# Patient Record
Sex: MALE | Race: WHITE | NOT HISPANIC OR LATINO | URBAN - METROPOLITAN AREA
[De-identification: names, ages, dates, MRNs, and addresses within clinical notes are randomized per-mention and may not be internally consistent; named-entity substitution may affect disease eponyms.]

---

## 2019-01-29 VITALS
WEIGHT: 270.07 LBS | OXYGEN SATURATION: 97 % | DIASTOLIC BLOOD PRESSURE: 66 MMHG | RESPIRATION RATE: 20 BRPM | SYSTOLIC BLOOD PRESSURE: 124 MMHG | HEIGHT: 69 IN | TEMPERATURE: 99 F | HEART RATE: 110 BPM

## 2019-01-29 LAB
ALBUMIN SERPL ELPH-MCNC: 4.5 G/DL — SIGNIFICANT CHANGE UP (ref 3.3–5)
ALP SERPL-CCNC: 90 U/L — SIGNIFICANT CHANGE UP (ref 40–120)
ALT FLD-CCNC: 29 U/L — SIGNIFICANT CHANGE UP (ref 10–45)
ANION GAP SERPL CALC-SCNC: 12 MMOL/L — SIGNIFICANT CHANGE UP (ref 5–17)
APTT BLD: 29.7 SEC — SIGNIFICANT CHANGE UP (ref 27.5–36.3)
AST SERPL-CCNC: 19 U/L — SIGNIFICANT CHANGE UP (ref 10–40)
BASOPHILS NFR BLD AUTO: 0.1 % — SIGNIFICANT CHANGE UP (ref 0–2)
BILIRUB SERPL-MCNC: 0.5 MG/DL — SIGNIFICANT CHANGE UP (ref 0.2–1.2)
BUN SERPL-MCNC: 17 MG/DL — SIGNIFICANT CHANGE UP (ref 7–23)
CALCIUM SERPL-MCNC: 9.5 MG/DL — SIGNIFICANT CHANGE UP (ref 8.4–10.5)
CHLORIDE SERPL-SCNC: 101 MMOL/L — SIGNIFICANT CHANGE UP (ref 96–108)
CO2 SERPL-SCNC: 27 MMOL/L — SIGNIFICANT CHANGE UP (ref 22–31)
CREAT SERPL-MCNC: 1.22 MG/DL — SIGNIFICANT CHANGE UP (ref 0.5–1.3)
EOSINOPHIL NFR BLD AUTO: 0.1 % — SIGNIFICANT CHANGE UP (ref 0–6)
GLUCOSE SERPL-MCNC: 148 MG/DL — HIGH (ref 70–99)
HCT VFR BLD CALC: 46.2 % — SIGNIFICANT CHANGE UP (ref 39–50)
HGB BLD-MCNC: 16.1 G/DL — SIGNIFICANT CHANGE UP (ref 13–17)
INR BLD: 1.15 — SIGNIFICANT CHANGE UP (ref 0.88–1.16)
LIDOCAIN IGE QN: 21 U/L — SIGNIFICANT CHANGE UP (ref 7–60)
LYMPHOCYTES # BLD AUTO: 4.2 % — LOW (ref 13–44)
MCHC RBC-ENTMCNC: 30.6 PG — SIGNIFICANT CHANGE UP (ref 27–34)
MCHC RBC-ENTMCNC: 34.8 G/DL — SIGNIFICANT CHANGE UP (ref 32–36)
MCV RBC AUTO: 87.7 FL — SIGNIFICANT CHANGE UP (ref 80–100)
MONOCYTES NFR BLD AUTO: 5.3 % — SIGNIFICANT CHANGE UP (ref 2–14)
NEUTROPHILS NFR BLD AUTO: 90.3 % — HIGH (ref 43–77)
PLATELET # BLD AUTO: 187 K/UL — SIGNIFICANT CHANGE UP (ref 150–400)
POTASSIUM SERPL-MCNC: 3.9 MMOL/L — SIGNIFICANT CHANGE UP (ref 3.5–5.3)
POTASSIUM SERPL-SCNC: 3.9 MMOL/L — SIGNIFICANT CHANGE UP (ref 3.5–5.3)
PROT SERPL-MCNC: 7.6 G/DL — SIGNIFICANT CHANGE UP (ref 6–8.3)
PROTHROM AB SERPL-ACNC: 13.1 SEC — HIGH (ref 10–12.9)
RBC # BLD: 5.27 M/UL — SIGNIFICANT CHANGE UP (ref 4.2–5.8)
RBC # FLD: 11.6 % — SIGNIFICANT CHANGE UP (ref 10.3–16.9)
SODIUM SERPL-SCNC: 140 MMOL/L — SIGNIFICANT CHANGE UP (ref 135–145)
WBC # BLD: 16.7 K/UL — HIGH (ref 3.8–10.5)
WBC # FLD AUTO: 16.7 K/UL — HIGH (ref 3.8–10.5)

## 2019-01-29 PROCEDURE — 74177 CT ABD & PELVIS W/CONTRAST: CPT | Mod: 26

## 2019-01-29 RX ORDER — ONDANSETRON 8 MG/1
4 TABLET, FILM COATED ORAL ONCE
Qty: 0 | Refills: 0 | Status: COMPLETED | OUTPATIENT
Start: 2019-01-29 | End: 2019-01-29

## 2019-01-29 RX ORDER — SODIUM CHLORIDE 9 MG/ML
1000 INJECTION INTRAMUSCULAR; INTRAVENOUS; SUBCUTANEOUS ONCE
Qty: 0 | Refills: 0 | Status: COMPLETED | OUTPATIENT
Start: 2019-01-29 | End: 2019-01-29

## 2019-01-29 RX ORDER — IOHEXOL 300 MG/ML
30 INJECTION, SOLUTION INTRAVENOUS ONCE
Qty: 0 | Refills: 0 | Status: COMPLETED | OUTPATIENT
Start: 2019-01-29 | End: 2019-01-29

## 2019-01-29 RX ORDER — HYDROMORPHONE HYDROCHLORIDE 2 MG/ML
0.5 INJECTION INTRAMUSCULAR; INTRAVENOUS; SUBCUTANEOUS ONCE
Qty: 0 | Refills: 0 | Status: DISCONTINUED | OUTPATIENT
Start: 2019-01-29 | End: 2019-01-29

## 2019-01-29 RX ADMIN — SODIUM CHLORIDE 1000 MILLILITER(S): 9 INJECTION INTRAMUSCULAR; INTRAVENOUS; SUBCUTANEOUS at 19:47

## 2019-01-29 RX ADMIN — HYDROMORPHONE HYDROCHLORIDE 0.5 MILLIGRAM(S): 2 INJECTION INTRAMUSCULAR; INTRAVENOUS; SUBCUTANEOUS at 19:48

## 2019-01-29 RX ADMIN — HYDROMORPHONE HYDROCHLORIDE 0.5 MILLIGRAM(S): 2 INJECTION INTRAMUSCULAR; INTRAVENOUS; SUBCUTANEOUS at 20:19

## 2019-01-29 RX ADMIN — SODIUM CHLORIDE 1000 MILLILITER(S): 9 INJECTION INTRAMUSCULAR; INTRAVENOUS; SUBCUTANEOUS at 22:19

## 2019-01-29 RX ADMIN — IOHEXOL 30 MILLILITER(S): 300 INJECTION, SOLUTION INTRAVENOUS at 19:47

## 2019-01-29 RX ADMIN — ONDANSETRON 4 MILLIGRAM(S): 8 TABLET, FILM COATED ORAL at 19:47

## 2019-01-29 NOTE — ED CLERICAL - NS ED CLERK NOTE PRE-ARRIVAL INFORMATION; ADDITIONAL PRE-ARRIVAL INFORMATION
38 y.o male Matthew Porter being sent in for right lower quadrant tenderness, evaluate for appendicitis. Contact DR. Payne [phone]

## 2019-01-29 NOTE — ED ADULT TRIAGE NOTE - CHIEF COMPLAINT QUOTE
Patient c/o RLQ abdominal pain and nausea started at 3pm today , was sent from urgent care for r/o appendicitis .

## 2019-01-29 NOTE — ED ADULT NURSE NOTE - NSIMPLEMENTINTERV_GEN_ALL_ED
Implemented All Universal Safety Interventions:  [place] to call system. Call bell, personal items and telephone within reach. Instruct patient to call for assistance. Room bathroom lighting operational. Non-slip footwear when patient is off stretcher. Physically safe environment: no spills, clutter or unnecessary equipment. Stretcher in lowest position, wheels locked, appropriate side rails in place.

## 2019-01-29 NOTE — ED PROVIDER NOTE - CARE PLAN
Principal Discharge DX:	Right lower quadrant abdominal pain  Secondary Diagnosis:	Leukocytosis, unspecified type Principal Discharge DX:	Acute appendicitis  Secondary Diagnosis:	Leukocytosis, unspecified type

## 2019-01-29 NOTE — ED ADULT NURSE NOTE - CHPI ED NUR SYMPTOMS NEG
no diarrhea/no blood in stool/no burning urination/no abdominal distension/no vomiting/no fever/no hematuria/no dysuria

## 2019-01-29 NOTE — ED PROVIDER NOTE - MEDICAL DECISION MAKING DETAILS
Patient in ED w concern for right lower Patient in ED w concern for right lower quad abd pain.  Sent from  for CT.  Labs reviewed - leukocytosis noted.  Following completion of my shift, patient's care is handed over to oncoming night team for final disposition pending CT abd/pel.  Patient is stable at time of transfer of care.

## 2019-01-29 NOTE — ED ADULT NURSE NOTE - OBJECTIVE STATEMENT
Pt a&ox3 walked in from triage complaining of RLQ pain, 10/10 that suddenly started this am. Pt c/o nausea denies vomiting. Went to urgent care and they said to come to the ER for rule out appendicitis. Pt denies fevers, chills. Speaking in full sentences. Family at bedside

## 2019-01-29 NOTE — ED ADULT NURSE REASSESSMENT NOTE - NS ED NURSE REASSESS COMMENT FT1
Abdominal pain, nausea and vomitting resolved s/p meds, NSS bolus, vital signs stable.  CT scan result and disposition pending.

## 2019-01-29 NOTE — ED PROVIDER NOTE - OBJECTIVE STATEMENT
38 year old male with no known medical history presents to ED with concern for right lower quadrant abdominal pain that began several hours ago today.  Patient notes associated nausea without emesis.  He denies associated fever, chills, chest pain, shortness of breath, changes to bowel movements, urinary symptoms or any additional acute complaints or concerns at this time.

## 2019-01-30 ENCOUNTER — INPATIENT (INPATIENT)
Facility: HOSPITAL | Age: 39
LOS: 2 days | Discharge: HOME | DRG: 340 | End: 2019-02-02
Attending: SURGERY | Admitting: SURGERY
Payer: COMMERCIAL

## 2019-01-30 DIAGNOSIS — K35.32 ACUTE APPENDICITIS WITH PERFORATION, LOCALIZED PERITONITIS, AND GANGRENE, WITHOUT ABSCESS: ICD-10-CM

## 2019-01-30 LAB
ANION GAP SERPL CALC-SCNC: 12 MMOL/L — SIGNIFICANT CHANGE UP (ref 5–17)
BLD GP AB SCN SERPL QL: NEGATIVE — SIGNIFICANT CHANGE UP
BLD GP AB SCN SERPL QL: NEGATIVE — SIGNIFICANT CHANGE UP
BUN SERPL-MCNC: 16 MG/DL — SIGNIFICANT CHANGE UP (ref 7–23)
CALCIUM SERPL-MCNC: 9.5 MG/DL — SIGNIFICANT CHANGE UP (ref 8.4–10.5)
CHLORIDE SERPL-SCNC: 101 MMOL/L — SIGNIFICANT CHANGE UP (ref 96–108)
CO2 SERPL-SCNC: 24 MMOL/L — SIGNIFICANT CHANGE UP (ref 22–31)
CREAT SERPL-MCNC: 1.36 MG/DL — HIGH (ref 0.5–1.3)
GLUCOSE SERPL-MCNC: 139 MG/DL — HIGH (ref 70–99)
HCT VFR BLD CALC: 43.9 % — SIGNIFICANT CHANGE UP (ref 39–50)
HGB BLD-MCNC: 15.2 G/DL — SIGNIFICANT CHANGE UP (ref 13–17)
MAGNESIUM SERPL-MCNC: 2.1 MG/DL — SIGNIFICANT CHANGE UP (ref 1.6–2.6)
MCHC RBC-ENTMCNC: 31.1 PG — SIGNIFICANT CHANGE UP (ref 27–34)
MCHC RBC-ENTMCNC: 34.6 G/DL — SIGNIFICANT CHANGE UP (ref 32–36)
MCV RBC AUTO: 89.8 FL — SIGNIFICANT CHANGE UP (ref 80–100)
PHOSPHATE SERPL-MCNC: 3.5 MG/DL — SIGNIFICANT CHANGE UP (ref 2.5–4.5)
PLATELET # BLD AUTO: 195 K/UL — SIGNIFICANT CHANGE UP (ref 150–400)
POTASSIUM SERPL-MCNC: 4.1 MMOL/L — SIGNIFICANT CHANGE UP (ref 3.5–5.3)
POTASSIUM SERPL-SCNC: 4.1 MMOL/L — SIGNIFICANT CHANGE UP (ref 3.5–5.3)
RBC # BLD: 4.89 M/UL — SIGNIFICANT CHANGE UP (ref 4.2–5.8)
RBC # FLD: 11.9 % — SIGNIFICANT CHANGE UP (ref 10.3–16.9)
RH IG SCN BLD-IMP: POSITIVE — SIGNIFICANT CHANGE UP
RH IG SCN BLD-IMP: POSITIVE — SIGNIFICANT CHANGE UP
SODIUM SERPL-SCNC: 137 MMOL/L — SIGNIFICANT CHANGE UP (ref 135–145)
WBC # BLD: 14 K/UL — HIGH (ref 3.8–10.5)
WBC # FLD AUTO: 14 K/UL — HIGH (ref 3.8–10.5)

## 2019-01-30 PROCEDURE — 99285 EMERGENCY DEPT VISIT HI MDM: CPT

## 2019-01-30 RX ORDER — PIPERACILLIN AND TAZOBACTAM 4; .5 G/20ML; G/20ML
3.38 INJECTION, POWDER, LYOPHILIZED, FOR SOLUTION INTRAVENOUS EVERY 6 HOURS
Qty: 0 | Refills: 0 | Status: DISCONTINUED | OUTPATIENT
Start: 2019-01-30 | End: 2019-01-30

## 2019-01-30 RX ORDER — INFLUENZA VIRUS VACCINE 15; 15; 15; 15 UG/.5ML; UG/.5ML; UG/.5ML; UG/.5ML
0.5 SUSPENSION INTRAMUSCULAR ONCE
Qty: 0 | Refills: 0 | Status: DISCONTINUED | OUTPATIENT
Start: 2019-01-30 | End: 2019-02-02

## 2019-01-30 RX ORDER — HEPARIN SODIUM 5000 [USP'U]/ML
7500 INJECTION INTRAVENOUS; SUBCUTANEOUS EVERY 8 HOURS
Qty: 0 | Refills: 0 | Status: DISCONTINUED | OUTPATIENT
Start: 2019-01-30 | End: 2019-01-30

## 2019-01-30 RX ORDER — HYDROMORPHONE HYDROCHLORIDE 2 MG/ML
0.5 INJECTION INTRAMUSCULAR; INTRAVENOUS; SUBCUTANEOUS ONCE
Qty: 0 | Refills: 0 | Status: DISCONTINUED | OUTPATIENT
Start: 2019-01-30 | End: 2019-01-30

## 2019-01-30 RX ORDER — SODIUM CHLORIDE 9 MG/ML
1000 INJECTION, SOLUTION INTRAVENOUS
Qty: 0 | Refills: 0 | Status: DISCONTINUED | OUTPATIENT
Start: 2019-01-30 | End: 2019-02-01

## 2019-01-30 RX ORDER — HYDROMORPHONE HYDROCHLORIDE 2 MG/ML
0.5 INJECTION INTRAMUSCULAR; INTRAVENOUS; SUBCUTANEOUS
Qty: 0 | Refills: 0 | Status: DISCONTINUED | OUTPATIENT
Start: 2019-01-30 | End: 2019-01-30

## 2019-01-30 RX ORDER — SODIUM CHLORIDE 9 MG/ML
1000 INJECTION, SOLUTION INTRAVENOUS
Qty: 0 | Refills: 0 | Status: DISCONTINUED | OUTPATIENT
Start: 2019-01-30 | End: 2019-01-30

## 2019-01-30 RX ORDER — KETOROLAC TROMETHAMINE 30 MG/ML
15 SYRINGE (ML) INJECTION EVERY 6 HOURS
Qty: 0 | Refills: 0 | Status: DISCONTINUED | OUTPATIENT
Start: 2019-01-30 | End: 2019-02-02

## 2019-01-30 RX ORDER — KETOROLAC TROMETHAMINE 30 MG/ML
15 SYRINGE (ML) INJECTION EVERY 6 HOURS
Qty: 0 | Refills: 0 | Status: DISCONTINUED | OUTPATIENT
Start: 2019-01-30 | End: 2019-01-30

## 2019-01-30 RX ORDER — HYDROMORPHONE HYDROCHLORIDE 2 MG/ML
0.5 INJECTION INTRAMUSCULAR; INTRAVENOUS; SUBCUTANEOUS EVERY 6 HOURS
Qty: 0 | Refills: 0 | Status: DISCONTINUED | OUTPATIENT
Start: 2019-01-30 | End: 2019-01-30

## 2019-01-30 RX ORDER — HEPARIN SODIUM 5000 [USP'U]/ML
7500 INJECTION INTRAVENOUS; SUBCUTANEOUS EVERY 8 HOURS
Qty: 0 | Refills: 0 | Status: DISCONTINUED | OUTPATIENT
Start: 2019-01-30 | End: 2019-02-02

## 2019-01-30 RX ORDER — HYDROMORPHONE HYDROCHLORIDE 2 MG/ML
0.5 INJECTION INTRAMUSCULAR; INTRAVENOUS; SUBCUTANEOUS
Qty: 0 | Refills: 0 | Status: DISCONTINUED | OUTPATIENT
Start: 2019-01-30 | End: 2019-02-02

## 2019-01-30 RX ORDER — BUPIVACAINE 13.3 MG/ML
20 INJECTION, SUSPENSION, LIPOSOMAL INFILTRATION ONCE
Qty: 0 | Refills: 0 | Status: DISCONTINUED | OUTPATIENT
Start: 2019-01-30 | End: 2019-02-02

## 2019-01-30 RX ORDER — PIPERACILLIN AND TAZOBACTAM 4; .5 G/20ML; G/20ML
3.38 INJECTION, POWDER, LYOPHILIZED, FOR SOLUTION INTRAVENOUS ONCE
Qty: 0 | Refills: 0 | Status: COMPLETED | OUTPATIENT
Start: 2019-01-30 | End: 2019-01-30

## 2019-01-30 RX ORDER — HYDROMORPHONE HYDROCHLORIDE 2 MG/ML
1 INJECTION INTRAMUSCULAR; INTRAVENOUS; SUBCUTANEOUS EVERY 6 HOURS
Qty: 0 | Refills: 0 | Status: DISCONTINUED | OUTPATIENT
Start: 2019-01-30 | End: 2019-01-30

## 2019-01-30 RX ORDER — ACETAMINOPHEN 500 MG
1000 TABLET ORAL ONCE
Qty: 0 | Refills: 0 | Status: COMPLETED | OUTPATIENT
Start: 2019-01-30 | End: 2019-01-30

## 2019-01-30 RX ORDER — ONDANSETRON 8 MG/1
4 TABLET, FILM COATED ORAL EVERY 6 HOURS
Qty: 0 | Refills: 0 | Status: DISCONTINUED | OUTPATIENT
Start: 2019-01-30 | End: 2019-02-02

## 2019-01-30 RX ORDER — PIPERACILLIN AND TAZOBACTAM 4; .5 G/20ML; G/20ML
3.38 INJECTION, POWDER, LYOPHILIZED, FOR SOLUTION INTRAVENOUS EVERY 6 HOURS
Qty: 0 | Refills: 0 | Status: DISCONTINUED | OUTPATIENT
Start: 2019-01-30 | End: 2019-02-02

## 2019-01-30 RX ADMIN — Medication 15 MILLIGRAM(S): at 12:22

## 2019-01-30 RX ADMIN — Medication 15 MILLIGRAM(S): at 18:17

## 2019-01-30 RX ADMIN — PIPERACILLIN AND TAZOBACTAM 200 GRAM(S): 4; .5 INJECTION, POWDER, LYOPHILIZED, FOR SOLUTION INTRAVENOUS at 12:06

## 2019-01-30 RX ADMIN — Medication 15 MILLIGRAM(S): at 18:02

## 2019-01-30 RX ADMIN — HYDROMORPHONE HYDROCHLORIDE 0.5 MILLIGRAM(S): 2 INJECTION INTRAMUSCULAR; INTRAVENOUS; SUBCUTANEOUS at 00:32

## 2019-01-30 RX ADMIN — Medication 400 MILLIGRAM(S): at 05:30

## 2019-01-30 RX ADMIN — Medication 15 MILLIGRAM(S): at 23:59

## 2019-01-30 RX ADMIN — SODIUM CHLORIDE 160 MILLILITER(S): 9 INJECTION, SOLUTION INTRAVENOUS at 02:27

## 2019-01-30 RX ADMIN — PIPERACILLIN AND TAZOBACTAM 200 GRAM(S): 4; .5 INJECTION, POWDER, LYOPHILIZED, FOR SOLUTION INTRAVENOUS at 23:59

## 2019-01-30 RX ADMIN — Medication 15 MILLIGRAM(S): at 12:03

## 2019-01-30 RX ADMIN — HEPARIN SODIUM 7500 UNIT(S): 5000 INJECTION INTRAVENOUS; SUBCUTANEOUS at 21:33

## 2019-01-30 RX ADMIN — PIPERACILLIN AND TAZOBACTAM 200 GRAM(S): 4; .5 INJECTION, POWDER, LYOPHILIZED, FOR SOLUTION INTRAVENOUS at 18:02

## 2019-01-30 RX ADMIN — PIPERACILLIN AND TAZOBACTAM 200 GRAM(S): 4; .5 INJECTION, POWDER, LYOPHILIZED, FOR SOLUTION INTRAVENOUS at 01:49

## 2019-01-30 RX ADMIN — PIPERACILLIN AND TAZOBACTAM 25 GRAM(S): 4; .5 INJECTION, POWDER, LYOPHILIZED, FOR SOLUTION INTRAVENOUS at 06:15

## 2019-01-30 RX ADMIN — HYDROMORPHONE HYDROCHLORIDE 0.5 MILLIGRAM(S): 2 INJECTION INTRAMUSCULAR; INTRAVENOUS; SUBCUTANEOUS at 00:49

## 2019-01-30 NOTE — PROGRESS NOTE ADULT - SUBJECTIVE AND OBJECTIVE BOX
POST-OPERATIVE NOTE    Procedure: laparoscopic appendectomy    Diagnosis/Indication: appendicitis    Surgeon: Dr. Jackson    S: Pt only c/o minimal pain with deep inhalations. Denies CP, SOB,  calf tenderness. Pain controlled with medication. Denies nausea, vomiting.    O:  T(C): 37.8 (01-30-19 @ 11:50), Max: 37.8 (01-30-19 @ 11:50)  T(F): 100.1 (01-30-19 @ 11:50), Max: 100.1 (01-30-19 @ 11:50)  HR: 86 (01-30-19 @ 12:20) (70 - 90)  BP: 108/79 (01-30-19 @ 12:20) (102/81 - 134/72)  RR: 17 (01-30-19 @ 12:20) (15 - 19)  SpO2: 95% (01-30-19 @ 12:20) (91% - 97%)  Wt(kg): --                        15.2   14.0  )-----------( 195      ( 30 Jan 2019 06:30 )             43.9     01-30    137  |  101  |  16  ----------------------------<  139<H>  4.1   |  24  |  1.36<H>    Ca    9.5      30 Jan 2019 06:30  Phos  3.5     01-30  Mg     2.1     01-30    TPro  7.6  /  Alb  4.5  /  TBili  0.5  /  DBili  x   /  AST  19  /  ALT  29  /  AlkPhos  90  01-29      Gen: NAD, resting comfortably in bed  C/V: NSR  Pulm: Nonlabored breathing, no respiratory distress  Abd: obese, softly distended, nontender, incisions CDI  Extrem:  SCDs in place

## 2019-01-30 NOTE — CONSULT NOTE ADULT - ASSESSMENT
37 yo Male with acute appendicitis with leukocytosis of [id number]  now s/p lap appendectomy w/perforation and gangrene

## 2019-01-30 NOTE — H&P ADULT - HISTORY OF PRESENT ILLNESS
This is a 37yo M with no PMHx or PSHx, presented with sudden onset of abdominal pain since 2.30pm yesterday. Pain started at RLQ and sharp in nature. It did not radiates/migrates anywhere. Pain aggravated upon movement and relieved at rest. No nausea/vomiting, no fever or chills. Last meal was 2pm yesterday.

## 2019-01-30 NOTE — PRE-OP CHECKLIST - SELECT TESTS ORDERED
CBC/CMP/Type and Screen/BMP/INR/PT/PTT no CXR per Md Sanders/INR/CMP/EKG/BMP/Type and Screen/CBC/PT/PTT

## 2019-01-30 NOTE — CONSULT NOTE ADULT - SUBJECTIVE AND OBJECTIVE BOX
HPI:  This is a 39yo M with no PMHx or PSHx, presented with sudden onset of abdominal pain since 2.30pm yesterday. Pain started at RLQ and sharp in nature. It did not radiates/migrates anywhere. Pain aggravated upon movement and relieved at rest. No nausea/vomiting, no fever or chills. Last meal was 2pm yesterday. (30 Jan 2019 02:00)  Patient underwenr Ex lap appendectomy and found perforated and gangrenous appendix; started on IV Zosyn.      PAST MEDICAL & SURGICAL HISTORY:  No pertinent past medical history  No significant past surgical history        REVIEW OF SYSTEMS:    General:  no weakness; low grade fevers, no chills  Skin/Breast: no rash  Respiratory and Thorax: no SOB, no cough  Cardiovascular:	No chest pain  Gastrointestinal:	 no nausea, vomiting postop pain well controlled  Genitourinary:	no dysuria, no difficulty urinating, no hematuria  Musculoskeletal:	no weakness, no joint swelling/pain  Neurological: no focal weakness/numbness  Endocrine: no polyuria, no polydipsia      ANTIBIOTICS:  MEDICATIONS  (STANDING):  BUpivacaine liposome 1.3% Injectable (no eMAR) 20 milliLiter(s) Local Injection once  heparin  Injectable 7500 Unit(s) SubCutaneous every 8 hours  influenza   Vaccine 0.5 milliLiter(s) IntraMuscular once  ketorolac   Injectable 15 milliGRAM(s) IV Push every 6 hours  lactated ringers. 1000 milliLiter(s) (150 mL/Hr) IV Continuous <Continuous>  piperacillin/tazobactam IVPB. 3.375 Gram(s) IV Intermittent every 6 hours    MEDICATIONS  (PRN):  HYDROmorphone  Injectable 0.5 milliGRAM(s) IV Push every 1 hour PRN Severe Pain (7 - 10)  ondansetron Injectable 4 milliGRAM(s) IV Push every 6 hours PRN Nausea      Allergies: No Known Allergies    SOCIAL HISTORY: no ETOH, no smoking    FAMILY HISTORY:n/c      Vital Signs Last 24 Hrs  T(C): 37.1 (30 Jan 2019 16:55), Max: 38.1 (30 Jan 2019 02:43)  T(F): 98.8 (30 Jan 2019 16:55), Max: 100.5 (30 Jan 2019 02:43)  HR: 80 (30 Jan 2019 16:55) (70 - 104)  BP: 106/73 (30 Jan 2019 16:55) (100/62 - 136/84)  BP(mean): 94 (30 Jan 2019 12:20) (70 - 97)  RR: 17 (30 Jan 2019 16:55) (15 - 19)  SpO2: 95% (30 Jan 2019 16:55) (91% - 98%)    01-30-19 @ 07:01  -  01-30-19 @ 19:36  --------------------------------------------------------  IN: 1775 mL / OUT: 1535 mL / NET: 240 mL        PHYSICAL EXAM:  Constitutional:Well-developed, well nourished  Eyes:ZEKE, EOMI  Ear/Nose/Throat: no oral lesion, no sinus tenderness on percussion	  Neck:no JVD, no lymphadenopathy, supple  Respiratory: CTA boogie  Cardiovascular: S1S2 RRR, no murmurs  Gastrointestinal: postop incisional tenderness  Extremities:no e/e/c  Vascular: DP Pulse:right normal; left normal            LABS:                        15.2   14.0  )-----------( 195      ( 30 Jan 2019 06:30 )             43.9     01-30    137  |  101  |  16  ----------------------------<  139<H>  4.1   |  24  |  1.36<H>    Ca    9.5      30 Jan 2019 06:30  Phos  3.5     01-30  Mg     2.1     01-30    TPro  7.6  /  Alb  4.5  /  TBili  0.5  /  DBili  x   /  AST  19  /  ALT  29  /  AlkPhos  90  01-29    PT/INR - ( 29 Jan 2019 19:47 )   PT: 13.1 sec;   INR: 1.15          PTT - ( 29 Jan 2019 19:47 )  PTT:29.7 sec      MICROBIOLOGY:  RADIOLOGY & ADDITIONAL STUDIES:

## 2019-01-30 NOTE — H&P ADULT - NSHPPHYSICALEXAM_GEN_ALL_CORE
Vital Signs Last 24 Hrs  T(C): 37.3 (30 Jan 2019 01:33), Max: 37.3 (30 Jan 2019 01:33)  T(F): 99.1 (30 Jan 2019 01:33), Max: 99.1 (30 Jan 2019 01:33)  HR: 100 (30 Jan 2019 01:33) (98 - 110)  BP: 119/78 (30 Jan 2019 01:33) (119/78 - 136/84)  RR: 17 (30 Jan 2019 01:33) (16 - 20)  SpO2: 96% (30 Jan 2019 01:33) (96% - 98%)    General: NAD, resting comfortably in bed  C/V: NSR  Pulm: Nonlabored breathing, no respiratory distress  Abd: soft, tender at RLQ, no rebound tenderness, no rovsing sign.  Extrem: WWP, no edema.

## 2019-01-30 NOTE — H&P ADULT - ASSESSMENT
37 yo M with acute appendicitis with leukocytosis of 16.    Plan :   Admit to [facility], regional floor, team 4  Pain and nausea control  NPO/IVF  Preop and added on for laparoscopic appendectomy 1/30.  IV.Zosyn stat and Q6hr  DVT prophylaxis  AM labs     Plan d/w , seen and d/w chief.

## 2019-01-31 LAB
ANION GAP SERPL CALC-SCNC: 12 MMOL/L — SIGNIFICANT CHANGE UP (ref 5–17)
BUN SERPL-MCNC: 16 MG/DL — SIGNIFICANT CHANGE UP (ref 7–23)
CALCIUM SERPL-MCNC: 8.9 MG/DL — SIGNIFICANT CHANGE UP (ref 8.4–10.5)
CHLORIDE SERPL-SCNC: 104 MMOL/L — SIGNIFICANT CHANGE UP (ref 96–108)
CO2 SERPL-SCNC: 24 MMOL/L — SIGNIFICANT CHANGE UP (ref 22–31)
CREAT SERPL-MCNC: 1.34 MG/DL — HIGH (ref 0.5–1.3)
GLUCOSE SERPL-MCNC: 106 MG/DL — HIGH (ref 70–99)
HCT VFR BLD CALC: 39.1 % — SIGNIFICANT CHANGE UP (ref 39–50)
HGB BLD-MCNC: 13 G/DL — SIGNIFICANT CHANGE UP (ref 13–17)
MAGNESIUM SERPL-MCNC: 2 MG/DL — SIGNIFICANT CHANGE UP (ref 1.6–2.6)
MCHC RBC-ENTMCNC: 30.3 PG — SIGNIFICANT CHANGE UP (ref 27–34)
MCHC RBC-ENTMCNC: 33.2 G/DL — SIGNIFICANT CHANGE UP (ref 32–36)
MCV RBC AUTO: 91.1 FL — SIGNIFICANT CHANGE UP (ref 80–100)
PHOSPHATE SERPL-MCNC: 3.5 MG/DL — SIGNIFICANT CHANGE UP (ref 2.5–4.5)
PLATELET # BLD AUTO: 156 K/UL — SIGNIFICANT CHANGE UP (ref 150–400)
POTASSIUM SERPL-MCNC: 3.8 MMOL/L — SIGNIFICANT CHANGE UP (ref 3.5–5.3)
POTASSIUM SERPL-SCNC: 3.8 MMOL/L — SIGNIFICANT CHANGE UP (ref 3.5–5.3)
RBC # BLD: 4.29 M/UL — SIGNIFICANT CHANGE UP (ref 4.2–5.8)
RBC # FLD: 11.9 % — SIGNIFICANT CHANGE UP (ref 10.3–16.9)
SODIUM SERPL-SCNC: 140 MMOL/L — SIGNIFICANT CHANGE UP (ref 135–145)
WBC # BLD: 9.7 K/UL — SIGNIFICANT CHANGE UP (ref 3.8–10.5)
WBC # FLD AUTO: 9.7 K/UL — SIGNIFICANT CHANGE UP (ref 3.8–10.5)

## 2019-01-31 RX ORDER — TAMSULOSIN HYDROCHLORIDE 0.4 MG/1
0.4 CAPSULE ORAL ONCE
Qty: 0 | Refills: 0 | Status: COMPLETED | OUTPATIENT
Start: 2019-01-31 | End: 2019-01-31

## 2019-01-31 RX ORDER — TAMSULOSIN HYDROCHLORIDE 0.4 MG/1
0.4 CAPSULE ORAL AT BEDTIME
Qty: 0 | Refills: 0 | Status: DISCONTINUED | OUTPATIENT
Start: 2019-01-31 | End: 2019-01-31

## 2019-01-31 RX ADMIN — Medication 15 MILLIGRAM(S): at 05:30

## 2019-01-31 RX ADMIN — Medication 15 MILLIGRAM(S): at 11:58

## 2019-01-31 RX ADMIN — PIPERACILLIN AND TAZOBACTAM 200 GRAM(S): 4; .5 INJECTION, POWDER, LYOPHILIZED, FOR SOLUTION INTRAVENOUS at 23:24

## 2019-01-31 RX ADMIN — PIPERACILLIN AND TAZOBACTAM 200 GRAM(S): 4; .5 INJECTION, POWDER, LYOPHILIZED, FOR SOLUTION INTRAVENOUS at 11:58

## 2019-01-31 RX ADMIN — Medication 15 MILLIGRAM(S): at 23:24

## 2019-01-31 RX ADMIN — HEPARIN SODIUM 7500 UNIT(S): 5000 INJECTION INTRAVENOUS; SUBCUTANEOUS at 21:04

## 2019-01-31 RX ADMIN — SODIUM CHLORIDE 150 MILLILITER(S): 9 INJECTION, SOLUTION INTRAVENOUS at 11:59

## 2019-01-31 RX ADMIN — HEPARIN SODIUM 7500 UNIT(S): 5000 INJECTION INTRAVENOUS; SUBCUTANEOUS at 05:30

## 2019-01-31 RX ADMIN — Medication 15 MILLIGRAM(S): at 23:45

## 2019-01-31 RX ADMIN — Medication 15 MILLIGRAM(S): at 06:00

## 2019-01-31 RX ADMIN — Medication 15 MILLIGRAM(S): at 00:30

## 2019-01-31 RX ADMIN — SODIUM CHLORIDE 150 MILLILITER(S): 9 INJECTION, SOLUTION INTRAVENOUS at 23:24

## 2019-01-31 RX ADMIN — Medication 15 MILLIGRAM(S): at 17:46

## 2019-01-31 RX ADMIN — TAMSULOSIN HYDROCHLORIDE 0.4 MILLIGRAM(S): 0.4 CAPSULE ORAL at 12:30

## 2019-01-31 RX ADMIN — PIPERACILLIN AND TAZOBACTAM 200 GRAM(S): 4; .5 INJECTION, POWDER, LYOPHILIZED, FOR SOLUTION INTRAVENOUS at 17:46

## 2019-01-31 RX ADMIN — PIPERACILLIN AND TAZOBACTAM 200 GRAM(S): 4; .5 INJECTION, POWDER, LYOPHILIZED, FOR SOLUTION INTRAVENOUS at 05:29

## 2019-01-31 RX ADMIN — HEPARIN SODIUM 7500 UNIT(S): 5000 INJECTION INTRAVENOUS; SUBCUTANEOUS at 13:40

## 2019-01-31 NOTE — PROGRESS NOTE ADULT - ATTENDING COMMENTS
Abdomen soft, appropriately tender, BS+, Flatus+, BM-, tolerating clear liquid diet, FRANDY serous.  IV ABX, DVT prophylaxis , Spirometry, OOB to ambulate.

## 2019-01-31 NOTE — PROGRESS NOTE ADULT - SUBJECTIVE AND OBJECTIVE BOX
INTERVAL HPI/OVERNIGHT EVENTS:   SURGERY ATTENDING    STATUS POST:  Laparoscopic appendectomy, Lysis of adhesions, evacuation of right upper quadrant abscess, abdominal washout and drainage for acute perforated gangrenous appendicitis with peritonitis.    POST OPERATIVE DAY #: 1    SUBJECTIVE:  Flatus: [x ] YES [ ] NO             Bowel Movement: [ ] YES [x ] NO  Pain (0-10):        2    Pain Control Adequate: [x ] YES [ ] NO  Nausea: [ ] YES [x ] NO            Vomiting: [ ] YES [x ] NO  Diarrhea: [ ] YES [x ] NO         Constipation: [ ] YES [x ] NO     Chest Pain: [ ] YES [x ] NO    SOB:  [ ] YES [x ] NO    MEDICATIONS  (STANDING):  BUpivacaine liposome 1.3% Injectable (no eMAR) 20 milliLiter(s) Local Injection once  heparin  Injectable 7500 Unit(s) SubCutaneous every 8 hours  influenza   Vaccine 0.5 milliLiter(s) IntraMuscular once  ketorolac   Injectable 15 milliGRAM(s) IV Push every 6 hours  lactated ringers. 1000 milliLiter(s) (150 mL/Hr) IV Continuous <Continuous>  piperacillin/tazobactam IVPB. 3.375 Gram(s) IV Intermittent every 6 hours    MEDICATIONS  (PRN):  HYDROmorphone  Injectable 0.5 milliGRAM(s) IV Push every 1 hour PRN Severe Pain (7 - 10)  ondansetron Injectable 4 milliGRAM(s) IV Push every 6 hours PRN Nausea      Vital Signs Last 24 Hrs  T(C): 36.8 (31 Jan 2019 16:45), Max: 37.4 (31 Jan 2019 00:12)  T(F): 98.2 (31 Jan 2019 16:45), Max: 99.3 (31 Jan 2019 00:12)  HR: 87 (31 Jan 2019 16:45) (78 - 96)  BP: 114/75 (31 Jan 2019 16:45) (107/70 - 123/79)  BP(mean): --  RR: 17 (31 Jan 2019 16:45) (16 - 17)  SpO2: 97% (31 Jan 2019 16:45) (95% - 98%)    PHYSICAL EXAM:      Constitutional:    Eyes:    ENMT:    Neck:    Breasts:    Back:    Respiratory:    Cardiovascular:    Gastrointestinal:    Genitourinary:    Rectal:    Extremities:    Vascular:    Neurological:    Skin:    Lymph Nodes:    Musculoskeletal:    Psychiatric:        I&O's Detail    30 Jan 2019 07:01  -  31 Jan 2019 07:00  --------------------------------------------------------  IN:    IV PiggyBack: 2000 mL    lactated ringers.: 1575 mL  Total IN: 3575 mL    OUT:    Bulb: 85 mL    Indwelling Catheter - Urethral: 2150 mL  Total OUT: 2235 mL    Total NET: 1340 mL      31 Jan 2019 07:01  -  31 Jan 2019 19:50  --------------------------------------------------------  IN:    Oral Fluid: 460 mL  Total IN: 460 mL    OUT:    Bulb: 5 mL    Indwelling Catheter - Urethral: 900 mL    Voided: 550 mL  Total OUT: 1455 mL    Total NET: -995 mL          LABS:                        13.0   9.7   )-----------( 156      ( 31 Jan 2019 06:47 )             39.1     01-31    140  |  104  |  16  ----------------------------<  106<H>  3.8   |  24  |  1.34<H>    Ca    8.9      31 Jan 2019 06:47  Phos  3.5     01-31  Mg     2.0     01-31            RADIOLOGY & ADDITIONAL STUDIES:

## 2019-01-31 NOTE — PROGRESS NOTE ADULT - SUBJECTIVE AND OBJECTIVE BOX
24 hr events:    SUBJECTIVE:  Pt seen and examined at bedside by chief resident. Pain controlled.     Vital Signs Last 24 Hrs  T(C): 36.7 (31 Jan 2019 09:20), Max: 37.4 (31 Jan 2019 00:12)  T(F): 98.1 (31 Jan 2019 09:20), Max: 99.3 (31 Jan 2019 00:12)  HR: 87 (31 Jan 2019 09:20) (78 - 96)  BP: 110/71 (31 Jan 2019 09:20) (106/73 - 117/79)  BP(mean): 94 (30 Jan 2019 12:20) (94 - 94)  RR: 16 (31 Jan 2019 09:20) (16 - 17)  SpO2: 98% (31 Jan 2019 09:20) (95% - 98%)    Physical Exam:  General: NAD  Abdominal: soft, NT/ND,   Extremities: warm well perfused  Neuro: A/O x3, no focal deficits, normal sensation  Pulses: palpable distal pulses    Lines/drains/tubes:  [place]    I&O's Summary    30 Jan 2019 07:01  -  31 Jan 2019 07:00  --------------------------------------------------------  IN: 3575 mL / OUT: 2235 mL / NET: 1340 mL        LABS:                        13.0   9.7   )-----------( 156      ( 31 Jan 2019 06:47 )             39.1     01-31    140  |  104  |  16  ----------------------------<  106<H>  3.8   |  24  |  1.34<H>    Ca    8.9      31 Jan 2019 06:47  Phos  3.5     01-31  Mg     2.0     01-31    TPro  7.6  /  Alb  4.5  /  TBili  0.5  /  DBili  x   /  AST  19  /  ALT  29  /  AlkPhos  90  01-29  PT/INR - ( 29 Jan 2019 19:47 )   PT: 13.1 sec;   INR: 1.15     PTT - ( 29 Jan 2019 19:47 )  PTT:29.7 sec  LIVER FUNCTIONS - ( 29 Jan 2019 19:47 )  Alb: 4.5 g/dL / Pro: 7.6 g/dL / ALK PHOS: 90 U/L / ALT: 29 U/L / AST: 19 U/L / GGT: x 24 hr events:  O/N: ANNAMARIE, AVSS, FRANDY 0/85  1/30: OR for lap appy perf/gang, POC wnl  O/N: Admitted w/ acute appendicitis. ANNAMARIE, VSS, Temp 100.5    SUBJECTIVE:  Pt seen and examined at bedside by chief resident. Pain controlled.    Vital Signs Last 24 Hrs  T(C): 36.7 (31 Jan 2019 09:20), Max: 37.4 (31 Jan 2019 00:12)  T(F): 98.1 (31 Jan 2019 09:20), Max: 99.3 (31 Jan 2019 00:12)  HR: 87 (31 Jan 2019 09:20) (78 - 96)  BP: 110/71 (31 Jan 2019 09:20) (106/73 - 117/79)  BP(mean): 94 (30 Jan 2019 12:20) (94 - 94)  RR: 16 (31 Jan 2019 09:20) (16 - 17)  SpO2: 98% (31 Jan 2019 09:20) (95% - 98%)    Physical Exam:  General: NAD  Abdominal: soft, NT/ND, incisions clear dry and intact  Extremities: warm well perfused  : Josh  Neuro: A/O x3, no focal deficits, normal sensation    Lines/drains/tubes:  Josh WISE in right gutter    I&O's Summary    30 Jan 2019 07:01  -  31 Jan 2019 07:00  --------------------------------------------------------  IN: 3575 mL / OUT: 2235 mL / NET: 1340 mL        LABS:                        13.0   9.7   )-----------( 156      ( 31 Jan 2019 06:47 )             39.1     01-31    140  |  104  |  16  ----------------------------<  106<H>  3.8   |  24  |  1.34<H>    Ca    8.9      31 Jan 2019 06:47  Phos  3.5     01-31  Mg     2.0     01-31    TPro  7.6  /  Alb  4.5  /  TBili  0.5  /  DBili  x   /  AST  19  /  ALT  29  /  AlkPhos  90  01-29  PT/INR - ( 29 Jan 2019 19:47 )   PT: 13.1 sec;   INR: 1.15     PTT - ( 29 Jan 2019 19:47 )  PTT:29.7 sec  LIVER FUNCTIONS - ( 29 Jan 2019 19:47 )  Alb: 4.5 g/dL / Pro: 7.6 g/dL / ALK PHOS: 90 U/L / ALT: 29 U/L / AST: 19 U/L / GGT: x

## 2019-01-31 NOTE — PROGRESS NOTE ADULT - SUBJECTIVE AND OBJECTIVE BOX
INTERVAL HPI/OVERNIGHT EVENTS: Improving, (+) flatus, starts clears    CONSTITUTIONAL:  Negative fever or chills, feels better  EYES:  Negative  blurry vision or double vision  CARDIOVASCULAR:  Negative for chest pain or palpitations  RESPIRATORY:  Negative for cough, wheezing, or SOB   GASTROINTESTINAL:  Negative for nausea, vomiting, no BM, incisional abdominal pain  GENITOURINARY:  Negative frequency, urgency or dysuria  NEUROLOGIC:  No headache, confusion, dizziness, lightheadedness      ANTIBIOTICS/RELEVANT:    MEDICATIONS  (STANDING):  BUpivacaine liposome 1.3% Injectable (no eMAR) 20 milliLiter(s) Local Injection once  heparin  Injectable 7500 Unit(s) SubCutaneous every 8 hours  influenza   Vaccine 0.5 milliLiter(s) IntraMuscular once  ketorolac   Injectable 15 milliGRAM(s) IV Push every 6 hours  lactated ringers. 1000 milliLiter(s) (150 mL/Hr) IV Continuous <Continuous>  piperacillin/tazobactam IVPB. 3.375 Gram(s) IV Intermittent every 6 hours    MEDICATIONS  (PRN):  HYDROmorphone  Injectable 0.5 milliGRAM(s) IV Push every 1 hour PRN Severe Pain (7 - 10)  ondansetron Injectable 4 milliGRAM(s) IV Push every 6 hours PRN Nausea        Vital Signs Last 24 Hrs  T(C): 36.6 (31 Jan 2019 13:30), Max: 37.4 (31 Jan 2019 00:12)  T(F): 97.8 (31 Jan 2019 13:30), Max: 99.3 (31 Jan 2019 00:12)  HR: 81 (31 Jan 2019 13:30) (78 - 96)  BP: 123/79 (31 Jan 2019 13:30) (106/73 - 123/79)  BP(mean): --  RR: 17 (31 Jan 2019 13:30) (16 - 17)  SpO2: 98% (31 Jan 2019 13:30) (95% - 98%)    01-30-19 @ 07:01  -  01-31-19 @ 07:00  --------------------------------------------------------  IN: 3575 mL / OUT: 2235 mL / NET: 1340 mL    01-31-19 @ 07:01  -  01-31-19 @ 15:26  --------------------------------------------------------  IN: 180 mL / OUT: 900 mL / NET: -720 mL      PHYSICAL EXAM:    Constitutional:Well-developed, well nourished  Eyes:ZEKE, EOMI  Ear/Nose/Throat: no oral lesion, no sinus tenderness on percussion	  Neck:no JVD, no lymphadenopathy, supple  Respiratory: CTA boogie  Cardiovascular: S1S2 RRR, no murmurs  Gastrointestinal: postop incisional tenderness  Extremities:no e/e/c  Vascular: DP Pulse:right normal; left normal  LABS:                        13.0   9.7   )-----------( 156      ( 31 Jan 2019 06:47 )             39.1     01-31    140  |  104  |  16  ----------------------------<  106<H>  3.8   |  24  |  1.34<H>    Ca    8.9      31 Jan 2019 06:47  Phos  3.5     01-31  Mg     2.0     01-31    TPro  7.6  /  Alb  4.5  /  TBili  0.5  /  DBili  x   /  AST  19  /  ALT  29  /  AlkPhos  90  01-29    PT/INR - ( 29 Jan 2019 19:47 )   PT: 13.1 sec;   INR: 1.15          PTT - ( 29 Jan 2019 19:47 )  PTT:29.7 sec      MICROBIOLOGY:      RADIOLOGY & ADDITIONAL STUDIES:

## 2019-02-01 LAB
ANION GAP SERPL CALC-SCNC: 10 MMOL/L — SIGNIFICANT CHANGE UP (ref 5–17)
BUN SERPL-MCNC: 12 MG/DL — SIGNIFICANT CHANGE UP (ref 7–23)
CALCIUM SERPL-MCNC: 8.9 MG/DL — SIGNIFICANT CHANGE UP (ref 8.4–10.5)
CHLORIDE SERPL-SCNC: 103 MMOL/L — SIGNIFICANT CHANGE UP (ref 96–108)
CO2 SERPL-SCNC: 26 MMOL/L — SIGNIFICANT CHANGE UP (ref 22–31)
CREAT SERPL-MCNC: 1.32 MG/DL — HIGH (ref 0.5–1.3)
GLUCOSE SERPL-MCNC: 99 MG/DL — SIGNIFICANT CHANGE UP (ref 70–99)
HCT VFR BLD CALC: 41.7 % — SIGNIFICANT CHANGE UP (ref 39–50)
HGB BLD-MCNC: 13.7 G/DL — SIGNIFICANT CHANGE UP (ref 13–17)
MAGNESIUM SERPL-MCNC: 2.1 MG/DL — SIGNIFICANT CHANGE UP (ref 1.6–2.6)
MCHC RBC-ENTMCNC: 30.2 PG — SIGNIFICANT CHANGE UP (ref 27–34)
MCHC RBC-ENTMCNC: 32.9 G/DL — SIGNIFICANT CHANGE UP (ref 32–36)
MCV RBC AUTO: 92.1 FL — SIGNIFICANT CHANGE UP (ref 80–100)
PHOSPHATE SERPL-MCNC: 2.7 MG/DL — SIGNIFICANT CHANGE UP (ref 2.5–4.5)
PLATELET # BLD AUTO: 149 K/UL — LOW (ref 150–400)
POTASSIUM SERPL-MCNC: 4 MMOL/L — SIGNIFICANT CHANGE UP (ref 3.5–5.3)
POTASSIUM SERPL-SCNC: 4 MMOL/L — SIGNIFICANT CHANGE UP (ref 3.5–5.3)
RBC # BLD: 4.53 M/UL — SIGNIFICANT CHANGE UP (ref 4.2–5.8)
RBC # FLD: 12 % — SIGNIFICANT CHANGE UP (ref 10.3–16.9)
SODIUM SERPL-SCNC: 139 MMOL/L — SIGNIFICANT CHANGE UP (ref 135–145)
WBC # BLD: 6.2 K/UL — SIGNIFICANT CHANGE UP (ref 3.8–10.5)
WBC # FLD AUTO: 6.2 K/UL — SIGNIFICANT CHANGE UP (ref 3.8–10.5)

## 2019-02-01 RX ORDER — SODIUM CHLORIDE 9 MG/ML
1000 INJECTION INTRAMUSCULAR; INTRAVENOUS; SUBCUTANEOUS
Qty: 0 | Refills: 0 | Status: DISCONTINUED | OUTPATIENT
Start: 2019-02-01 | End: 2019-02-02

## 2019-02-01 RX ADMIN — Medication 15 MILLIGRAM(S): at 11:20

## 2019-02-01 RX ADMIN — HEPARIN SODIUM 7500 UNIT(S): 5000 INJECTION INTRAVENOUS; SUBCUTANEOUS at 21:04

## 2019-02-01 RX ADMIN — Medication 15 MILLIGRAM(S): at 23:45

## 2019-02-01 RX ADMIN — Medication 15 MILLIGRAM(S): at 17:12

## 2019-02-01 RX ADMIN — HEPARIN SODIUM 7500 UNIT(S): 5000 INJECTION INTRAVENOUS; SUBCUTANEOUS at 13:27

## 2019-02-01 RX ADMIN — PIPERACILLIN AND TAZOBACTAM 200 GRAM(S): 4; .5 INJECTION, POWDER, LYOPHILIZED, FOR SOLUTION INTRAVENOUS at 11:07

## 2019-02-01 RX ADMIN — Medication 15 MILLIGRAM(S): at 17:30

## 2019-02-01 RX ADMIN — SODIUM CHLORIDE 150 MILLILITER(S): 9 INJECTION INTRAMUSCULAR; INTRAVENOUS; SUBCUTANEOUS at 11:07

## 2019-02-01 RX ADMIN — Medication 15 MILLIGRAM(S): at 11:07

## 2019-02-01 RX ADMIN — Medication 15 MILLIGRAM(S): at 05:42

## 2019-02-01 RX ADMIN — PIPERACILLIN AND TAZOBACTAM 200 GRAM(S): 4; .5 INJECTION, POWDER, LYOPHILIZED, FOR SOLUTION INTRAVENOUS at 17:12

## 2019-02-01 RX ADMIN — Medication 15 MILLIGRAM(S): at 23:24

## 2019-02-01 RX ADMIN — PIPERACILLIN AND TAZOBACTAM 200 GRAM(S): 4; .5 INJECTION, POWDER, LYOPHILIZED, FOR SOLUTION INTRAVENOUS at 23:24

## 2019-02-01 RX ADMIN — Medication 15 MILLIGRAM(S): at 06:00

## 2019-02-01 RX ADMIN — HEPARIN SODIUM 7500 UNIT(S): 5000 INJECTION INTRAVENOUS; SUBCUTANEOUS at 05:42

## 2019-02-01 RX ADMIN — PIPERACILLIN AND TAZOBACTAM 200 GRAM(S): 4; .5 INJECTION, POWDER, LYOPHILIZED, FOR SOLUTION INTRAVENOUS at 05:42

## 2019-02-01 NOTE — PROGRESS NOTE ADULT - ASSESSMENT
37 yo M with acute appendicitis with leukocytosis of 16 now s/p lap appendectomy perf and gang    Pain and nausea control  NPO/IVF  IV Zosyn   FRANDY in right gutter  pathak  DVT prophylaxis  AM labs/ SCDs/IS
37 yo M with acute appendicitis with leukocytosis of 16 now s/p lap appy perf and gang    Pain and nausea control  CLD/IVF  IV Zosyn   FRANDY in right gutter  DVT prophylaxis  AM labs/ SCDs/IS  fluids changed to NS   renal consult
37 yo Male with acute appendicitis with leukocytosis [id number]  now s/p lap appendectomy w/perforation and gangrene, with  improved leukocytosis [id number]
39 yo Male with acute appendicitis with leukocytosis of [id number]  now s/p lap appendectomy w/perforation and gangrene, improved leukocytosis
37 yo M with acute appendicitis with leukocytosis of 16 now s/p lap appy perf and gang    Pain and nausea control  CLD/IVF  IV Zosyn   FRANDY in right gutter  DVT prophylaxis  AM labs/ SCDs/IS  Flomax  Moreno in place, remove after Flomax

## 2019-02-01 NOTE — DISCHARGE NOTE ADULT - HOSPITAL COURSE
37yo M with no PMHx or PSHx, presented with sudden onset of abdominal pain since 2.30pm yesterday. Pain started at RLQ and sharp in nature. It did not radiates/migrates anywhere. Pain aggravated upon movement and relieved at rest. No nausea/vomiting, no fever or chills. On arrival to the ED, patient was afebrile, tachycardic to 110, remaining VSS. Labs significant for WBC of 16.7. CT showed acute appendicitis with no evidence of perforation. The patient went to the OR that night for laparoscopic appendectomy. His appendix was found to be perforated and gangrenous. A FRANDY was left in place. On POD1, his pathak was removed and he passed his trial of void. He was advanced to a clear liquid diet, which he tolerated well. On POD 2, the patient had normal bowel function…………………… 37yo M with no PMHx or PSHx, presented with sudden onset of abdominal pain since 2.30pm yesterday. Pain started at RLQ and sharp in nature. It did not radiates/migrates anywhere. Pain aggravated upon movement and relieved at rest. No nausea/vomiting, no fever or chills. On arrival to the ED, patient was afebrile, tachycardic to 110, remaining VSS. Labs significant for WBC of 16.7. CT showed acute appendicitis with no evidence of perforation. The patient went to the OR that night for laparoscopic appendectomy. His appendix was found to be perforated and gangrenous. A FRANDY was left in place. On POD1, his pathak was removed and he passed his trial of void. He was advanced to a clear liquid diet, which he tolerated well. On POD 2, the patient had normal bowel function and was tolerating a clear liquid diet. The FRANDY drain was removed and the patient is being discharged home with 12 more days of antibiotics. The patient is to follow up with Dr. Jackson in 2 weeks.

## 2019-02-01 NOTE — DISCHARGE NOTE ADULT - CARE PLAN
Principal Discharge DX:	Acute appendicitis  Goal:	Recovery  Assessment and plan of treatment:	After discharge, please rest at home for four days. Apply ice packs to the area at all times. Drink plenty of fluids, aiming for 2L/day. For pain control, take Tylenol extra strength (500mg) x2tabs and Advil (200mg) x1tab every six hours, standing. Take one tab of vicodin before bedtime. Continue a soft diet until you have a bowel movement, then advance as tolerated. Principal Discharge DX:	Acute appendicitis  Goal:	Recovery  Assessment and plan of treatment:	After discharge, please rest at home for four days. Apply ice packs to the area at all times. Drink plenty of fluids, aiming for 2L/day. For pain control, take Tylenol extra strength (500mg) x2tabs and Advil (200mg) x1tab every six hours, standing. Take one tab of vicodin before bedtime. Continue a soft diet until you have a bowel movement, then advance as tolerated.  Goal:	Recovery  Assessment and plan of treatment:	Please follow up with Dr. Jackson in 1 week.  Call the office to schedule your appointment.  You may shower using soap and water over incision sites.  Do not scrub incision sites and pat dry when done.  No tub bathing or swimming until confirmation during your follow up appointment.  Keep incision sites out of the sun, as scars will darken.  Ambulate as tolerated, but no heavy lifting (>10lbs) or strenuous exercise for 4 days.  Use ice packs to ice the area at all times. Remain on a liquid diet until bowl movement.  You should be urinating at least 3-4x per day.  Call the office if you experience increasing pain, abdominal pain, nausea, vomiting, or temperature >101.4F.

## 2019-02-01 NOTE — DISCHARGE NOTE ADULT - ADDITIONAL INSTRUCTIONS
1. Take 2 extra strength Tylenol + 1 Advil = 3 tablets at the same time EVERY 6 hours standing  2. Take 1 Vicodin at bed time  3. Take Augmentin 875mg twice a day for 10 more days

## 2019-02-01 NOTE — DISCHARGE NOTE ADULT - CONDITIONS AT DISCHARGE
VSS. tolerated diet. passed flatus, voided freely adequate amount. lap incision sites with steri-strips remained C/D/I. medically cleared for discharge by team MD, discharge instructions was given , verbalized his understanding. IV access was removed.

## 2019-02-01 NOTE — PROGRESS NOTE ADULT - SUBJECTIVE AND OBJECTIVE BOX
24 hr events:  O/N: Alexandra CLD, passed TOV, ANNAMARIE, AVSS, FRANDY 10  1/31: flomax, remove pathak, advanced to CLD    SUBJECTIVE:  Pt seen and examined at bedside by chief resident. Pt laying comfortably on bed. Doing well. Pain controlled. +F/-BM. Voiding well.       Vital Signs Last 24 Hrs  T(C): 36.9 (01 Feb 2019 08:30), Max: 37.2 (01 Feb 2019 05:41)  T(F): 98.4 (01 Feb 2019 08:30), Max: 99 (01 Feb 2019 05:41)  HR: 94 (01 Feb 2019 08:30) (77 - 94)  BP: 134/82 (01 Feb 2019 08:30) (105/70 - 134/82)  BP(mean): --  RR: 16 (01 Feb 2019 08:30) (16 - 17)  SpO2: 96% (01 Feb 2019 08:30) (96% - 98%)    Physical Exam:  General: NAD  Pulmonary: Nonlabored breathing, no respiratory distress  Abdominal: soft, NT/ND, clean and dry bandages, FRANDY in place  Extremities: warm and well perfused  Neuro: A/O x3, no focal deficits, normal sensation    Lines/drains/tubes:    I&O's Summary    31 Jan 2019 07:01  -  01 Feb 2019 07:00  --------------------------------------------------------  IN: 2430 mL / OUT: 3160 mL / NET: -730 mL    01 Feb 2019 07:01  -  01 Feb 2019 12:23  --------------------------------------------------------  IN: 700 mL / OUT: 0 mL / NET: 700 mL        LABS:                        13.7   6.2   )-----------( 149      ( 01 Feb 2019 06:17 )             41.7     02-01    139  |  103  |  12  ----------------------------<  99  4.0   |  26  |  1.32<H>    Ca    8.9      01 Feb 2019 06:17  Phos  2.7     02-01  Mg     2.1     02-01

## 2019-02-01 NOTE — PROGRESS NOTE ADULT - SUBJECTIVE AND OBJECTIVE BOX
INTERVAL HPI/OVERNIGHT EVENTS:   SURGERY ATTENDING    STATUS POST:      Laparoscopic appendectomy, Lysis of adhesions, evacuation of right upper quadrant abscess, abdominal washout and drainage for acute perforated gangrenous appendicitis with peritonitis.      POST OPERATIVE DAY #: 2    SUBJECTIVE:  Flatus: [x ] YES [ ] NO             Bowel Movement: [x ] YES [ ] NO  Pain (0-10):        1   Pain Control Adequate: [x ] YES [ ] NO  Nausea: [ ] YES [x ] NO            Vomiting: [ ] YES [x ] NO  Diarrhea: [ ] YES [x ] NO         Constipation: [ ] YES [x ] NO     Chest Pain: [ ] YES [x ] NO    SOB:  [ ] YES [x ] NO      MEDICATIONS  (STANDING):  BUpivacaine liposome 1.3% Injectable (no eMAR) 20 milliLiter(s) Local Injection once  heparin  Injectable 7500 Unit(s) SubCutaneous every 8 hours  influenza   Vaccine 0.5 milliLiter(s) IntraMuscular once  ketorolac   Injectable 15 milliGRAM(s) IV Push every 6 hours  piperacillin/tazobactam IVPB. 3.375 Gram(s) IV Intermittent every 6 hours  sodium chloride 0.9%. 1000 milliLiter(s) (150 mL/Hr) IV Continuous <Continuous>    MEDICATIONS  (PRN):  HYDROmorphone  Injectable 0.5 milliGRAM(s) IV Push every 1 hour PRN Severe Pain (7 - 10)  ondansetron Injectable 4 milliGRAM(s) IV Push every 6 hours PRN Nausea      Vital Signs Last 24 Hrs  T(C): 36.9 (01 Feb 2019 20:06), Max: 37.2 (01 Feb 2019 05:41)  T(F): 98.5 (01 Feb 2019 20:06), Max: 99 (01 Feb 2019 05:41)  HR: 78 (01 Feb 2019 20:06) (77 - 94)  BP: 113/75 (01 Feb 2019 20:06) (105/70 - 134/82)  BP(mean): --  RR: 16 (01 Feb 2019 20:06) (16 - 18)  SpO2: 98% (01 Feb 2019 20:06) (96% - 98%)    PHYSICAL EXAM:      Constitutional:    Eyes:    ENMT:    Neck:    Breasts:    Back:    Respiratory:    Cardiovascular:    Gastrointestinal:    Genitourinary:    Rectal:    Extremities:    Vascular:    Neurological:    Skin:    Lymph Nodes:    Musculoskeletal:    Psychiatric:        I&O's Detail    31 Jan 2019 07:01  -  01 Feb 2019 07:00  --------------------------------------------------------  IN:    IV PiggyBack: 200 mL    lactated ringers.: 1650 mL    Oral Fluid: 580 mL  Total IN: 2430 mL    OUT:    Bulb: 10 mL    Indwelling Catheter - Urethral: 900 mL    Voided: 2250 mL  Total OUT: 3160 mL    Total NET: -730 mL      01 Feb 2019 07:01  -  01 Feb 2019 21:18  --------------------------------------------------------  IN:    IV PiggyBack: 100 mL    lactated ringers.: 450 mL    Oral Fluid: 300 mL    sodium chloride 0.9%.: 1050 mL  Total IN: 1900 mL    OUT:    Bulb: 40 mL  Total OUT: 40 mL    Total NET: 1860 mL          LABS:                        13.7   6.2   )-----------( 149      ( 01 Feb 2019 06:17 )             41.7     02-01    139  |  103  |  12  ----------------------------<  99  4.0   |  26  |  1.32<H>    Ca    8.9      01 Feb 2019 06:17  Phos  2.7     02-01  Mg     2.1     02-01            RADIOLOGY & ADDITIONAL STUDIES:

## 2019-02-01 NOTE — DISCHARGE NOTE ADULT - PATIENT PORTAL LINK FT
You can access the [facility] Patient Portal, offered by [facility], by registering with the following website: [email or web address]

## 2019-02-01 NOTE — DISCHARGE NOTE ADULT - PLAN OF CARE
Recovery After discharge, please rest at home for four days. Apply ice packs to the area at all times. Drink plenty of fluids, aiming for 2L/day. For pain control, take Tylenol extra strength (500mg) x2tabs and Advil (200mg) x1tab every six hours, standing. Take one tab of vicodin before bedtime. Continue a soft diet until you have a bowel movement, then advance as tolerated. Please follow up with Dr. Jackson in 1 week.  Call the office to schedule your appointment.  You may shower using soap and water over incision sites.  Do not scrub incision sites and pat dry when done.  No tub bathing or swimming until confirmation during your follow up appointment.  Keep incision sites out of the sun, as scars will darken.  Ambulate as tolerated, but no heavy lifting (>10lbs) or strenuous exercise for 4 days.  Use ice packs to ice the area at all times. Remain on a liquid diet until bowl movement.  You should be urinating at least 3-4x per day.  Call the office if you experience increasing pain, abdominal pain, nausea, vomiting, or temperature >101.4F.

## 2019-02-01 NOTE — DISCHARGE NOTE ADULT - CARE PROVIDER_API CALL
Laura Jackson (MD)  Surgery  [address], [address]  [place], [place] [address]  Phone: [phone]  Fax: [phone]

## 2019-02-01 NOTE — DISCHARGE NOTE ADULT - NS AS DC FOLLOWUP STROKE INST
Influenza vaccination (VIS Pub Date: August 7, 2015)/[facility] ([facility]) Online Patient Education: Surgical Wound Discharge Instructions

## 2019-02-01 NOTE — PROGRESS NOTE ADULT - PROBLEM SELECTOR PLAN 1
1) Continue IV Zosyn 3.375gr IV q6h while in [facility]  2) No OR culture available, so upon discharge will switch to oral Augmentin

## 2019-02-01 NOTE — PROGRESS NOTE ADULT - ATTENDING COMMENTS
Abdomen soft, appropriately tender, BS+, Flatus+, BM+, tolerating clear liquid diet, FRANDY serous.  IV ABX, DVT prophylaxis , Spirometry, OOB to ambulate, Advance to full liquid diet, D/C home on PO ABX with F/U in the office.

## 2019-02-01 NOTE — DISCHARGE NOTE ADULT - MEDICATION SUMMARY - MEDICATIONS TO TAKE
I will START or STAY ON the medications listed below when I get home from the hospital:    Vicodin 5 mg-300 mg oral tablet  -- 1 tab(s) by mouth every 6 hours, As Needed -for severe pain MDD:4 Tabs  -- Caution federal law prohibits the transfer of this drug to any person other  than the person for whom it was prescribed.  Do not drink alcoholic beverages when taking this medication.  May cause drowsiness.  Alcohol may intensify this effect.  Use care when operating dangerous machinery.  This drug may impair the ability to drive or operate machinery.  Use care until you become familiar with its effects.  This product contains acetaminophen.  Do not use  with any other product containing acetaminophen to prevent possible liver damage.  Using more of this medication than prescribed may cause serious breathing problems.    -- Indication: For Severe pain    Augmentin 875 mg-125 mg oral tablet  -- 1 tab(s) by mouth 2 times a day   -- Finish all this medication unless otherwise directed by prescriber.  Take with food or milk.    -- Indication: For Antibiotics

## 2019-02-01 NOTE — PROGRESS NOTE ADULT - SUBJECTIVE AND OBJECTIVE BOX
INTERVAL HPI/OVERNIGHT EVENTS: Feels better, (+) flatus, no BM    CONSTITUTIONAL:  Negative fever or chills  EYES:  Negative  blurry vision or double vision  CARDIOVASCULAR:  Negative for chest pain or palpitations  RESPIRATORY:  Negative for cough, wheezing, or SOB   GASTROINTESTINAL:  Negative for nausea, vomiting, or abdominal pain  GENITOURINARY:  Negative frequency, urgency , Moreno removed  NEUROLOGIC:  No headache, confusion, dizziness, lightheadedness      ANTIBIOTICS/RELEVANT:    MEDICATIONS  (STANDING):  BUpivacaine liposome 1.3% Injectable (no eMAR) 20 milliLiter(s) Local Injection once  heparin  Injectable 7500 Unit(s) SubCutaneous every 8 hours  influenza   Vaccine 0.5 milliLiter(s) IntraMuscular once  ketorolac   Injectable 15 milliGRAM(s) IV Push every 6 hours  piperacillin/tazobactam IVPB. 3.375 Gram(s) IV Intermittent every 6 hours  sodium chloride 0.9%. 1000 milliLiter(s) (150 mL/Hr) IV Continuous <Continuous>    MEDICATIONS  (PRN):  HYDROmorphone  Injectable 0.5 milliGRAM(s) IV Push every 1 hour PRN Severe Pain (7 - 10)  ondansetron Injectable 4 milliGRAM(s) IV Push every 6 hours PRN Nausea        Vital Signs Last 24 Hrs  T(C): 36.9 (01 Feb 2019 12:05), Max: 37.2 (01 Feb 2019 05:41)  T(F): 98.5 (01 Feb 2019 12:05), Max: 99 (01 Feb 2019 05:41)  HR: 77 (01 Feb 2019 12:05) (77 - 94)  BP: 116/74 (01 Feb 2019 12:05) (105/70 - 134/82)  BP(mean): --  RR: 18 (01 Feb 2019 12:05) (16 - 18)  SpO2: 96% (01 Feb 2019 12:05) (96% - 98%)    01-31-19 @ 07:01  -  02-01-19 @ 07:00  --------------------------------------------------------  IN: 2430 mL / OUT: 3160 mL / NET: -730 mL    02-01-19 @ 07:01  -  02-01-19 @ 14:17  --------------------------------------------------------  IN: 1150 mL / OUT: 0 mL / NET: 1150 mL      PHYSICAL EXAM:  Constitutional:Well-developed, well nourished  Eyes:ZEKE, EOMI  Ear/Nose/Throat: no oral lesion, no sinus tenderness on percussion	  Neck:no JVD, no lymphadenopathy, supple  Respiratory: CTA boogie  Cardiovascular: S1S2 RRR, no murmurs  Gastrointestinal: postop incisional tenderness, RLQ FRANDY in place  Extremities:no e/e/c  Vascular: DP Pulse: right normal; left normal    LABS:                        13.7   6.2   )-----------( 149      ( 01 Feb 2019 06:17 )             41.7     02-01    139  |  103  |  12  ----------------------------<  99  4.0   |  26  |  1.32<H>    Ca    8.9      01 Feb 2019 06:17  Phos  2.7     02-01  Mg     2.1     02-01

## 2019-02-02 VITALS
TEMPERATURE: 98 F | RESPIRATION RATE: 18 BRPM | OXYGEN SATURATION: 98 % | SYSTOLIC BLOOD PRESSURE: 127 MMHG | HEART RATE: 70 BPM | DIASTOLIC BLOOD PRESSURE: 82 MMHG

## 2019-02-02 LAB
ANION GAP SERPL CALC-SCNC: 11 MMOL/L — SIGNIFICANT CHANGE UP (ref 5–17)
BUN SERPL-MCNC: 9 MG/DL — SIGNIFICANT CHANGE UP (ref 7–23)
CALCIUM SERPL-MCNC: 9 MG/DL — SIGNIFICANT CHANGE UP (ref 8.4–10.5)
CHLORIDE SERPL-SCNC: 102 MMOL/L — SIGNIFICANT CHANGE UP (ref 96–108)
CO2 SERPL-SCNC: 27 MMOL/L — SIGNIFICANT CHANGE UP (ref 22–31)
CREAT SERPL-MCNC: 1.31 MG/DL — HIGH (ref 0.5–1.3)
GLUCOSE SERPL-MCNC: 94 MG/DL — SIGNIFICANT CHANGE UP (ref 70–99)
HCT VFR BLD CALC: 41.3 % — SIGNIFICANT CHANGE UP (ref 39–50)
HGB BLD-MCNC: 13.9 G/DL — SIGNIFICANT CHANGE UP (ref 13–17)
MAGNESIUM SERPL-MCNC: 2 MG/DL — SIGNIFICANT CHANGE UP (ref 1.6–2.6)
MCHC RBC-ENTMCNC: 30.7 PG — SIGNIFICANT CHANGE UP (ref 27–34)
MCHC RBC-ENTMCNC: 33.7 G/DL — SIGNIFICANT CHANGE UP (ref 32–36)
MCV RBC AUTO: 91.2 FL — SIGNIFICANT CHANGE UP (ref 80–100)
PHOSPHATE SERPL-MCNC: 2.9 MG/DL — SIGNIFICANT CHANGE UP (ref 2.5–4.5)
PLATELET # BLD AUTO: 162 K/UL — SIGNIFICANT CHANGE UP (ref 150–400)
POTASSIUM SERPL-MCNC: 4 MMOL/L — SIGNIFICANT CHANGE UP (ref 3.5–5.3)
POTASSIUM SERPL-SCNC: 4 MMOL/L — SIGNIFICANT CHANGE UP (ref 3.5–5.3)
RBC # BLD: 4.53 M/UL — SIGNIFICANT CHANGE UP (ref 4.2–5.8)
RBC # FLD: 11.8 % — SIGNIFICANT CHANGE UP (ref 10.3–16.9)
SODIUM SERPL-SCNC: 140 MMOL/L — SIGNIFICANT CHANGE UP (ref 135–145)
WBC # BLD: 6.1 K/UL — SIGNIFICANT CHANGE UP (ref 3.8–10.5)
WBC # FLD AUTO: 6.1 K/UL — SIGNIFICANT CHANGE UP (ref 3.8–10.5)

## 2019-02-02 PROCEDURE — 84100 ASSAY OF PHOSPHORUS: CPT

## 2019-02-02 PROCEDURE — 83735 ASSAY OF MAGNESIUM: CPT

## 2019-02-02 PROCEDURE — 86850 RBC ANTIBODY SCREEN: CPT

## 2019-02-02 PROCEDURE — 85610 PROTHROMBIN TIME: CPT

## 2019-02-02 PROCEDURE — 96375 TX/PRO/DX INJ NEW DRUG ADDON: CPT

## 2019-02-02 PROCEDURE — 99285 EMERGENCY DEPT VISIT HI MDM: CPT | Mod: 25

## 2019-02-02 PROCEDURE — 86900 BLOOD TYPING SEROLOGIC ABO: CPT

## 2019-02-02 PROCEDURE — 96374 THER/PROPH/DIAG INJ IV PUSH: CPT | Mod: XU

## 2019-02-02 PROCEDURE — 88304 TISSUE EXAM BY PATHOLOGIST: CPT

## 2019-02-02 PROCEDURE — 83690 ASSAY OF LIPASE: CPT

## 2019-02-02 PROCEDURE — 85027 COMPLETE CBC AUTOMATED: CPT

## 2019-02-02 PROCEDURE — 96361 HYDRATE IV INFUSION ADD-ON: CPT

## 2019-02-02 PROCEDURE — 86901 BLOOD TYPING SEROLOGIC RH(D): CPT

## 2019-02-02 PROCEDURE — 96376 TX/PRO/DX INJ SAME DRUG ADON: CPT

## 2019-02-02 PROCEDURE — 80048 BASIC METABOLIC PNL TOTAL CA: CPT

## 2019-02-02 PROCEDURE — 80053 COMPREHEN METABOLIC PANEL: CPT

## 2019-02-02 PROCEDURE — 85730 THROMBOPLASTIN TIME PARTIAL: CPT

## 2019-02-02 PROCEDURE — 85025 COMPLETE CBC W/AUTO DIFF WBC: CPT

## 2019-02-02 PROCEDURE — 74177 CT ABD & PELVIS W/CONTRAST: CPT

## 2019-02-02 PROCEDURE — 36415 COLL VENOUS BLD VENIPUNCTURE: CPT

## 2019-02-02 RX ORDER — ACETAMINOPHEN 500 MG
1000 TABLET ORAL ONCE
Qty: 0 | Refills: 0 | Status: COMPLETED | OUTPATIENT
Start: 2019-02-02 | End: 2019-02-02

## 2019-02-02 RX ORDER — OXYCODONE AND ACETAMINOPHEN 5; 325 MG/1; MG/1
1 TABLET ORAL EVERY 4 HOURS
Qty: 0 | Refills: 0 | Status: DISCONTINUED | OUTPATIENT
Start: 2019-02-02 | End: 2019-02-02

## 2019-02-02 RX ORDER — OXYCODONE AND ACETAMINOPHEN 5; 325 MG/1; MG/1
2 TABLET ORAL EVERY 4 HOURS
Qty: 0 | Refills: 0 | Status: DISCONTINUED | OUTPATIENT
Start: 2019-02-02 | End: 2019-02-02

## 2019-02-02 RX ADMIN — Medication 1000 MILLIGRAM(S): at 12:09

## 2019-02-02 RX ADMIN — Medication 15 MILLIGRAM(S): at 05:34

## 2019-02-02 RX ADMIN — Medication 15 MILLIGRAM(S): at 05:28

## 2019-02-02 RX ADMIN — PIPERACILLIN AND TAZOBACTAM 200 GRAM(S): 4; .5 INJECTION, POWDER, LYOPHILIZED, FOR SOLUTION INTRAVENOUS at 05:28

## 2019-02-02 RX ADMIN — HEPARIN SODIUM 7500 UNIT(S): 5000 INJECTION INTRAVENOUS; SUBCUTANEOUS at 05:28

## 2019-02-02 NOTE — PROGRESS NOTE ADULT - REASON FOR ADMISSION
Acute appendicitis
Acute perforated gangrenous appendicitis, peritonitis.
Acute appendicitis
Acute appendicitis

## 2019-02-02 NOTE — PROGRESS NOTE ADULT - SUBJECTIVE AND OBJECTIVE BOX
Message left on identified voicemail with the following information: Lab result/INR coumadin dose required,date of when to f/u with INR.  Instructions  to call MD office if there is a diiference in Coumadin dosage,any unusual bleeding or any change in diet, health status,medications or missed doses Next INR 4 weeks  Anticoagulation Dose Instructions as of 1/12/2017       Sun Mon Tue Wed Thu Fri Sat    New Dose 7.5 mg 7.5 mg 5 mg 7.5 mg 7.5 mg 5 mg 7.5 mg      Description          1/11 Dose verified and maintained.                                             INTERVAL HPI/OVERNIGHT EVENTS:   SURGERY ATTENDING    STATUS POST:      Laparoscopic appendectomy, Lysis of adhesions, evacuation of right upper quadrant abscess, abdominal washout and drainage for acute perforated gangrenous appendicitis with peritonitis.    POST OPERATIVE DAY #: 3    SUBJECTIVE:  Flatus: [x ] YES [ ] NO             Bowel Movement: [x ] YES [ ] NO  Pain (0-10):        1   Pain Control Adequate: [x ] YES [ ] NO  Nausea: [ ] YES [x ] NO            Vomiting: [ ] YES [x ] NO  Diarrhea: [ ] YES [x ] NO         Constipation: [ ] YES [x ] NO     Chest Pain: [ ] YES [x ] NO    SOB:  [ ] YES [x ] NO    MEDICATIONS  (STANDING):  BUpivacaine liposome 1.3% Injectable (no eMAR) 20 milliLiter(s) Local Injection once  heparin  Injectable 7500 Unit(s) SubCutaneous every 8 hours  influenza   Vaccine 0.5 milliLiter(s) IntraMuscular once  piperacillin/tazobactam IVPB. 3.375 Gram(s) IV Intermittent every 6 hours    MEDICATIONS  (PRN):  ondansetron Injectable 4 milliGRAM(s) IV Push every 6 hours PRN Nausea  oxyCODONE    5 mG/acetaminophen 325 mG 1 Tablet(s) Oral every 4 hours PRN Moderate Pain (4 - 6)  oxyCODONE    5 mG/acetaminophen 325 mG 2 Tablet(s) Oral every 4 hours PRN Severe Pain (7 - 10)      Vital Signs Last 24 Hrs  T(C): 36.9 (02 Feb 2019 09:00), Max: 36.9 (01 Feb 2019 20:06)  T(F): 98.4 (02 Feb 2019 09:00), Max: 98.5 (01 Feb 2019 20:06)  HR: 70 (02 Feb 2019 09:00) (70 - 82)  BP: 127/82 (02 Feb 2019 09:00) (113/75 - 127/82)  BP(mean): --  RR: 18 (02 Feb 2019 09:00) (16 - 18)  SpO2: 98% (02 Feb 2019 09:00) (98% - 98%)    PHYSICAL EXAM:      Constitutional:    Eyes:    ENMT:    Neck:    Breasts:    Back:    Respiratory:    Cardiovascular:    Gastrointestinal:    Genitourinary:    Rectal:    Extremities:    Vascular:    Neurological:    Skin:    Lymph Nodes:    Musculoskeletal:    Psychiatric:        I&O's Detail    01 Feb 2019 07:01  -  02 Feb 2019 07:00  --------------------------------------------------------  IN:    IV PiggyBack: 100 mL    lactated ringers.: 450 mL    Oral Fluid: 300 mL    sodium chloride 0.9%: 1800 mL  Total IN: 2650 mL    OUT:    Bulb: 65 mL    Voided: 1600 mL  Total OUT: 1665 mL    Total NET: 985 mL      02 Feb 2019 07:01  -  02 Feb 2019 13:07  --------------------------------------------------------  IN:    Oral Fluid: 300 mL  Total IN: 300 mL    OUT:    Voided: 200 mL  Total OUT: 200 mL    Total NET: 100 mL          LABS:                        13.9   6.1   )-----------( 162      ( 02 Feb 2019 07:13 )             41.3     02-02    140  |  102  |  9   ----------------------------<  94  4.0   |  27  |  1.31<H>    Ca    9.0      02 Feb 2019 07:13  Phos  2.9     02-02  Mg     2.0     02-02            RADIOLOGY & ADDITIONAL STUDIES:

## 2019-02-02 NOTE — PROGRESS NOTE ADULT - ATTENDING COMMENTS
Abdomen soft, appropriately tender, BS+, Flatus+, BM+, tolerating clear liquid diet, FRANDY serous.  IV ABX, DVT prophylaxis , Spirometry, OOB to ambulate, tolerating full liquid diet, D/C home on PO ABX with F/U in the office.

## 2019-02-04 LAB — SURGICAL PATHOLOGY STUDY: SIGNIFICANT CHANGE UP

## 2019-02-06 DIAGNOSIS — E66.01 MORBID (SEVERE) OBESITY DUE TO EXCESS CALORIES: ICD-10-CM

## 2019-02-06 DIAGNOSIS — R10.31 RIGHT LOWER QUADRANT PAIN: ICD-10-CM

## 2019-02-06 DIAGNOSIS — K35.33 ACUTE APPENDICITIS WITH PERFORATION, LOCALIZED PERITONITIS, AND GANGRENE, WITH ABSCESS: ICD-10-CM

## 2020-12-14 ENCOUNTER — OUTPATIENT (OUTPATIENT)
Dept: OUTPATIENT SERVICES | Facility: HOSPITAL | Age: 40
LOS: 1 days | End: 2020-12-14
Payer: COMMERCIAL

## 2020-12-14 PROCEDURE — 75571 CT HRT W/O DYE W/CA TEST: CPT

## 2020-12-14 PROCEDURE — 75571 CT HRT W/O DYE W/CA TEST: CPT | Mod: 26

## 2021-01-08 NOTE — DISCHARGE NOTE ADULT - THE PATIENT HAS
Addended by: MILADYS MALDONADO on: 1/8/2021 11:23 AM     Modules accepted: Orders     no difficulties

## 2024-07-29 NOTE — H&P ADULT - PSH
Encounter addended by: Alison Naylor RN on: 7/29/2024 9:40 AM   Actions taken: Contacts section saved, Flowsheet accepted No significant past surgical history